# Patient Record
(demographics unavailable — no encounter records)

---

## 2024-11-04 NOTE — REVIEW OF SYSTEMS
[Fever] : no fever [Chills] : no chills [Chest Pain] : no chest pain [Palpitations] : no palpitations [Cough] : no cough [SOB on Exertion] : shortness of breath during exertion [see HPI] : see HPI

## 2024-11-04 NOTE — HISTORY OF PRESENT ILLNESS
[Lung Cancer Screening] : Patient underwent lung cancer screening [1] : 1 [FreeTextEntry1] : Patient presents for annual visit and follow up on [facility]-certified conditions.  [facility]-conditions:   Asthma: He follows with pulm-Dr. Mcgovern, last seen July 2024. His breo was switched to trelegy due to a persistent morning cough and BROOKS. He is doing much better on trelegy. His cough has significantly improved though his BROOKS is still persistent. Going uphill, walking on the beach, going up the stairs triggers his BROOKS. He uses his albuterol once a week depending on what he is doing. He is also taking singulair.   Chronic pharyngitis/nasopharyngitis, chronic rhinitis: Azelastine + Fluticasone was increased to 2 sprays BID. His PND and rhinorrhea has improved considerably with this dose change.   SHARAD: Reports adherence with nocturnal CPAP at 12 cm H2O. Compliance report from August to Nov 2024 shows usage 90/90 days. AHI of 0.7.  PSG done in May 2015 with AHI 61.6. CPAP titration in July recommended CPAP 12 qhs  GERD: Never had endoscopy. Symptoms are stable on Pantoprazole daily, gets reflux if he misses a dose. No dysphagia. Saw GI-Dr. Mckinnon in January.   BCC: Last saw derm Dec 2023. No new lesions.  Hx: Had right axillary lesion biopsied Feb 2015, pathology showed pigmented BCC.    Non-WTC:   He had Left knee surgery for torn meniscus in Oct 2024.   He saw ENT-Dr. Block for hearing loss. He was sent for CT which showed right myringosclerosis, moderate to severe right middle ear opacity that involves the lateral attic meso, retro and hypotympana, moderate to severe right mastoid sinus opacification. He had MT tube placed in right ear. 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 He saw ENT-Dr. Malcolm as his ENT left the practice. He was then referred to neurosurgeon-Dr. Rogers. No indication for surgery at that time as pt had mild b/l high frequency hearing loss only and did not have otorrhea. Pt reports that since early October, he began to have yellowish discharge from the right ear, clogged sensation, and decreased hearing. He is due to see ENT-Dr. Malcolm in December.   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   [TextBox_12] : 03/15 [TextBox_27] : 03/16 [TextBox_42] : 11/22 [TextBox_57] : CT 03/16 with 2 mm RUL nodule; Lung RADS not specified in report

## 2024-11-04 NOTE — HISTORY OF PRESENT ILLNESS
[Lung Cancer Screening] : Patient underwent lung cancer screening [1] : 1 [FreeTextEntry1] : Patient presents for annual visit and follow up on [facility]-certified conditions.  [facility]-conditions:   Asthma: He follows with pulm-Dr. Mcgovern, last seen July 2024. His breo was switched to trelegy due to a persistent morning cough and BROOKS. He is doing much better on trelegy. His cough has significantly improved though his BROOKS is still persistent. Going uphill, walking on the beach, going up the stairs triggers his BROOKS. He uses his albuterol once a week depending on what he is doing. He is also taking singulair.   Chronic pharyngitis/nasopharyngitis, chronic rhinitis: Azelastine + Fluticasone was increased to 2 sprays BID. His PND and rhinorrhea has improved considerably with this dose change.   SHARAD: Reports adherence with nocturnal CPAP at 12 cm H2O. Compliance report from August to Nov 2024 shows usage 90/90 days. AHI of 0.7.  PSG done in May 2015 with AHI 61.6. CPAP titration in July recommended CPAP 12 qhs  GERD: Never had endoscopy. Symptoms are stable on Pantoprazole daily, gets reflux if he misses a dose. No dysphagia. Saw GI-Dr. Mckinnon in January.   BCC: Last saw derm Dec 2023. No new lesions.  Hx: Had right axillary lesion biopsied Feb 2015, pathology showed pigmented BCC.    Non-WTC:   He had Left knee surgery for torn meniscus in Oct 2024.   He saw ENT-Dr. Block for hearing loss. He was sent for CT which showed right myringosclerosis, moderate to severe right middle ear opacity that involves the lateral attic meso, retro and hypotympana, moderate to severe right mastoid sinus opacification. He had MT tube placed in right ear. 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 He saw ENT-Dr. Malcolm as his ENT left the practice. He was then referred to neurosurgeon-Dr. Rogers. No indication for surgery at that time as pt had mild b/l high frequency hearing loss only and did not have otorrhea. Pt reports that since early October, he began to have yellowish discharge from the right ear, clogged sensation, and decreased hearing. He is due to see ENT-Dr. Malcolm in December.   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   [TextBox_12] : 03/15 [TextBox_27] : 03/16 [TextBox_42] : 11/22 [TextBox_57] : CT 03/16 with 2 mm RUL nodule; Lung RADS not specified in report

## 2024-11-04 NOTE — HEALTH RISK ASSESSMENT
[Patient reported colonoscopy was normal] : Patient reported colonoscopy was normal [1] : 1 [ColonoscopyDate] : 08/2024 [ColonoscopyComments] : 1 polyp, return in 5 years  [LowDoseCTScan] : 11/22

## 2024-11-04 NOTE — REASON FOR VISIT
[Follow-Up] : a follow-up visit [FreeTextEntry1] : chronic pharyngitis/nasopharyngitis, chronic rhinitis, GERD, asthma, SHARAD and BCC

## 2024-11-04 NOTE — ASSESSMENT
[FreeTextEntry1] : Asthma:  -continue trelegy and singulair daily  -continue albuterol as needed  -LDCT ordered  -f/u with pulm-Dr. Mcgovern, still with some BROOKS, reports had a cardiac work-up prior to his knee surgery which was reportedly normal  -pt to call if symptoms change/worsen   Chronic pharyngitis/nasopharyngitis and rhinitis: -Continue Azelastine + Fluticasone 2 sprays BID   SHARAD:  -good compliance -continue CPAP at 12cm   GERD:  -symptoms well controlled on daily protonix -continue lifestyle and diet modifications  -pt to call if symptoms change/worsen or needs to see GI   BCC:  -refer to derm for FBSE   Non-WTC conditions:  Pt to f/u with neurosurgeon and ENT for evaluation of tegmen defect

## 2024-11-04 NOTE — PHYSICAL EXAM
[General Appearance - Alert] : alert [General Appearance - In No Acute Distress] : in no acute distress [Sclera] : the sclera and conjunctiva were normal [Extraocular Movements] : extraocular movements were intact [Oropharynx] : the oropharynx was normal [Neck Appearance] : the appearance of the neck was normal [Neck Cervical Mass (___cm)] : no neck mass was observed [Thyroid Diffuse Enlargement] : the thyroid was not enlarged [] : no respiratory distress [Respiration, Rhythm And Depth] : normal respiratory rhythm and effort [Exaggerated Use Of Accessory Muscles For Inspiration] : no accessory muscle use [Auscultation Breath Sounds / Voice Sounds] : lungs were clear to auscultation bilaterally [Apical Impulse] : the apical impulse was normal [Heart Rate And Rhythm] : heart rate was normal and rhythm regular [Heart Sounds] : normal S1 and S2 [Murmurs] : no murmurs [FreeTextEntry1] : 1+ pretibial edema, b/l LE  [Bowel Sounds] : normal bowel sounds [Abdomen Soft] : soft [Abdomen Tenderness] : non-tender [Abdomen Mass (___ Cm)] : no abdominal mass palpated [Oriented To Time, Place, And Person] : oriented to person, place, and time

## 2024-11-04 NOTE — DISCUSSION/SUMMARY
[Patient seen for WTC Monitoring ___] : Patient was seen for WTC monitoring [unfilled] [Please See Note in Chart and Documentation in Trial DB] : Please see note in chart and documentation in Trial DB. [FreeTextEntry3] : ID [id number].  HPI: 63 yo M, certified for chronic pharyngitis/nasopharyngitis, chronic rhinitis, GERD, asthma, SHARAD and BCC, presents for annual visit.   PCP: Dr. Drew Ware, [facility] Pulm: Dr. Mark Mcgovern Cardio: Dr. Darnell Chatterjee GI: Dr. Oneil Weinberg  [facility] Ground [employer] Exposure Hx: On 01, worked 8 hours with [employer] providing [facility] and assisting with evacuation. 01-01, present at [employer] approximately 24 hours sifting through debris and doing search and rescue. 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  PMH/PSH:  - [facility] certified: chronic pharyngitis/nasopharyngitis, chronic rhinitis, GERD, asthma, SHARAD and BCC - Noncertified: HTN, diverticulitis, MI s/pt stents ; s/p L illiac aneurysm endovascular repair (transient ischemic colitis following procedure treated conservatively; subsequent R iliac aneurysm), DM 2016 Family Hx: father had lung cancer Allergies: see above; anaphylaxis to shrimp Meds: trelegy, protonix, valsartan-HCTZ, atorvastatin, plavix, zetia, farxiga, gabapentin, isosorbide mononitrate, metoprolol, metformin, naltrexone, ozempic  Social Hx: two adult children; enjoys camping; originally from  -Smokin pack year history, quit in   Review of Systems: IAMQ reviewed with patient  PE: see follow up note and Trial DB  Results: - LDCT 2023: Emphysema is present. The lungs are otherwise clear - Spirometry: deferred, had it done at pulm office   A/P: - CBC, CMP, lipids and UA ordered - LDCT ordered - Colonoscopy done Aug 2024, 1 polyp, return in 5 years (reviewed on pt's health bryan on phone) - Flu shot declined - Reviewed past labs and imaging

## 2024-11-04 NOTE — DISCUSSION/SUMMARY
[Patient seen for WTC Monitoring ___] : Patient was seen for WTC monitoring [unfilled] [Please See Note in Chart and Documentation in Trial DB] : Please see note in chart and documentation in Trial DB. [FreeTextEntry3] : ID [id number].  HPI: 63 yo M, certified for chronic pharyngitis/nasopharyngitis, chronic rhinitis, GERD, asthma, SHARAD and BCC, presents for annual visit.   PCP: Dr. Drew Ware, [facility] Pulm: Dr. Mark Mcgovern Cardio: Dr. Darnell Chatterjee GI: Dr. Oneil Weinberg  [facility] [employer] Exposure Hx: On 01, worked 8 hours with [employer] providing [facility] and assisting with evacuation. 01-01, present at [facility] approximately 24 hours sifting through debris and doing search and rescue. 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  PMH/PSH:  - [facility] certified: chronic pharyngitis/nasopharyngitis, chronic rhinitis, GERD, asthma, SHARAD and BCC - Noncertified: HTN, diverticulitis, MI s/pt stents ; s/p L illiac aneurysm endovascular repair (transient ischemic colitis following procedure treated conservatively; subsequent R iliac aneurysm), DM 2016 Family Hx: father had lung cancer Allergies: see above; anaphylaxis to shrimp Meds: trelegy, protonix, valsartan-HCTZ, atorvastatin, plavix, zetia, farxiga, gabapentin, isosorbide mononitrate, metoprolol, metformin, naltrexone, ozempic  Social Hx: two adult children; enjoys camping; originally from  -Smokin pack year history, quit in   Review of Systems: IAMQ reviewed with patient  PE: see follow up note and Trial DB  Results: - LDCT 2023: Emphysema is present. The lungs are otherwise clear - Spirometry: deferred, had it done at pulm office   A/P: - CBC, CMP, lipids and UA ordered - LDCT ordered - Colonoscopy done Aug 2024, 1 polyp, return in 5 years (reviewed on pt's health bryan on phone) - Flu shot declined - Reviewed past labs and imaging

## 2024-11-27 NOTE — DATA REVIEWED
Please advise    Last OV: 6/24/24  Next OV: n/a  Last lab: 2/14/24  Alprazolam 0.5MG   Dispensed: 8/15/2023  Sold: 8/15/2023      ALPRAZolam (XANAX) 0.5 MG tablet         Sig: Take 1 tablet by mouth 3 times daily as needed for Anxiety.    Disp: 30 tablet    Refills: 0    Start: 8/1/2024    Class: Eprescribe    Last ordered: 11 months ago (8/15/2023) by Trevin Higuera DO    Controlled Substances Refill Protocol - 12 Month Protocol - ALWAYS forward to ordering provider [id number]/01/2024 09:15 AM   Protocol Details FORWARD TO PROVIDER - Refill requests for these medications must ALWAYS be forwarded to the ordering provider, even if all criteria are met    PDMP has been reviewed in last 24 hours    Urine/serum drug screen on file in the appropriate time frame    Active/up-to-date controlled substances agreement/consent on file    Seen by prescribing provider or same department within the last 6 months or has a future appt in 6 months - IF FAILED PLEASE LOOK AT CHART REVIEW FOR LAST VISIT AND PROCEED ACCORDINGLY            [Lung Cancer Screening] : Patient underwent lung cancer screening

## 2024-11-27 NOTE — HISTORY OF PRESENT ILLNESS
[Former] : Former [TextBox_13] : Patient is scheduled for a annual  LDCT for lung cancer screening.  Chart review performed to confirm eligibility for LDCT.    No documented personal history of lung cancer. No documented s/s of lung cancer. Patient is a former smoker with a 20 pack year hx. [PacksperYear] : 20

## 2024-12-04 NOTE — HISTORY OF PRESENT ILLNESS
[de-identified] : 64 year old male with tegmen defect and cephalocele seen on MRI  Had right foul smelling otorrhea in October- used ear drops which helped. Saw Dr Rogers who sent him for MRI Brain- h

## 2024-12-04 NOTE — PHYSICAL EXAM
[de-identified] : right tube dry & open, left TM normal  [Midline] : trachea located in midline position [Normal] : orientation to person, place, and time: normal

## 2024-12-04 NOTE — HISTORY OF PRESENT ILLNESS
[de-identified] : 64 year old male with tegmen defect and cephalocele seen on MRI  Had right foul smelling otorrhea in October- used ear drops which helped. Saw Dr Rogers who sent him for MRI Brain- h

## 2024-12-04 NOTE — PHYSICAL EXAM
[de-identified] : right tube dry & open, left TM normal  [Midline] : trachea located in midline position [Normal] : orientation to person, place, and time: normal

## 2024-12-19 NOTE — PHYSICAL EXAM
[Midline] : trachea located in midline position [Normal] : orientation to person, place, and time: normal [de-identified] : right tube open, dry, left normal

## 2024-12-19 NOTE — PHYSICAL EXAM
[Midline] : trachea located in midline position [Normal] : orientation to person, place, and time: normal [de-identified] : right tube open, dry, left normal

## 2024-12-19 NOTE — HISTORY OF PRESENT ILLNESS
[de-identified] : 64 year old male with tegmen defect and cephalocele seen on MRI presents for follow-up to discuss surgery. Reports constant right "pulsatile" tinnitus and decreased hearing in the Right. Denies otalgia, otorrhea, recent fevers/ear infections, dizziness, vertigo, headaches related to hearing.  CT IACs No Cont done 12/5/2024 IMPRESSION: Right-sided cephalocele in association with a tegmen dehiscence

## 2024-12-19 NOTE — DATA REVIEWED
[de-identified] : Hearing WNL through 3 KHz, sloping to a mild to moderate SNHL. RE: Mild conductive HL at 250 Hz, rising to hearing WNL through 4 KHz, sloping to a moderate to moderately severe HL. Type B Tymp, RE, and Type A Tymp, LE.

## 2024-12-19 NOTE — DATA REVIEWED
[de-identified] : Hearing WNL through 3 KHz, sloping to a mild to moderate SNHL. RE: Mild conductive HL at 250 Hz, rising to hearing WNL through 4 KHz, sloping to a moderate to moderately severe HL. Type B Tymp, RE, and Type A Tymp, LE.

## 2024-12-19 NOTE — HISTORY OF PRESENT ILLNESS
[de-identified] : 64 year old male with tegmen defect and cephalocele seen on MRI presents for follow-up to discuss surgery. Reports constant right "pulsatile" tinnitus and decreased hearing in the Right. Denies otalgia, otorrhea, recent fevers/ear infections, dizziness, vertigo, headaches related to hearing.  CT IACs No Cont done 12/5/2024 IMPRESSION: Right-sided cephalocele in association with a tegmen dehiscence

## 2025-01-15 NOTE — HISTORY OF PRESENT ILLNESS
[Former] : former [< 20 pack-years] : < 20 pack-years [Never] : never [TextBox_4] : Mr. Felix is a 65 year-old male with a history of [facility] [employer] exposure, mild persistent asthma, upper airway cough syndrome, HTN, HLD, CAD s/p MI s/p stents (2015), SHARAD on CPAP 12 cm H2O qhs, former smoker, PVD s/p bilateral iliac artery endovascular repair, GERD, and h/o diverticulitis who presents for follow-up. He remains adherent with Trelegy Ellipta 200 mcg 1 inhalation daily, rinses after use. He reports significant improvement in dyspnea, cough, and chest tightness with Trelegy. He also takes montelukast 10 mg at bedtime. He is prescribed fluticasone and azelastine nasal sprays for his upper airway cough syndrome. Morning cough is improved. He takes albuterol prn, 2 times per week. Also takes pantoprazole for GERD.Reports adherence with nocturnal CPAP 12 cm H2O. Compliance review reveals adherence on 30/30 days, 10 hours 3 minutes per day, no significant leak, and therapeutic AHI is 1. He changes the water daily. Changes tubing and mask regularly.  PFTs (Jan 2025) with mild restriction, borderline low lung volumes, and normal DLCO. Incomplete bronchodilator response. Note that although FEV1/FVC ratio is above the lower limit of normal, the ratio of 68% is low, suggestive of likely COPD given smoking history.  CT CHEST lung cancer screening (Dec 2024) no consolidation, pleural effusion, lymphadenopathy, pneumothorax, endobronchial disease, or suspicious lung nodule. There is bilateral subsegmental atelectasis. No bronchiectasis or honeycombing. Pending repeat in December 2025.  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 CT chest lung cancer screening in Nov 2023 with clear lungs, Lung RADS 1. Repeat in Nov 2024.  SHARAD: sleep study in May 2015 with AHI 61.6. CPAP titration in July recommended CPAP 12 qhs. He is adherent with nightly therapy. Reports improved daytime somnolence. Does report falling asleep during the day, but does not fall asleep while driving. He does not clean the tubing or mask. He changes the filter water daily  Up to date with COVID-19 vaccinations. [TextBox_11] : 1 [TextBox_13] : 30 [YearQuit] : 2015

## 2025-01-15 NOTE — REVIEW OF SYSTEMS
[Postnasal Drip] : postnasal drip [Cough] : cough [Dyspnea] : dyspnea [Wheezing] : wheezing [Negative] : Endocrine [Chest Tightness] : no chest tightness

## 2025-01-15 NOTE — PHYSICAL EXAM
[No Acute Distress] : no acute distress [Well Nourished] : well nourished [Well Developed] : well developed [Normal Oropharynx] : normal oropharynx [Normal Appearance] : normal appearance [Supple] : supple [No Neck Mass] : no neck mass [No JVD] : no jvd [Normal Rate/Rhythm] : normal rate/rhythm [Normal Pulses] : normal pulses [Normal S1, S2] : normal s1, s2 [No Murmurs] : no murmurs [No Resp Distress] : no resp distress [No Acc Muscle Use] : no acc muscle use [Clear to Auscultation Bilaterally] : clear to auscultation bilaterally [No Abnormalities] : no abnormalities [Benign] : benign [Not Tender] : not tender [Soft] : soft [Normal Gait] : normal gait [No Clubbing] : no clubbing [No Cyanosis] : no cyanosis [No Edema] : no edema [FROM] : FROM [Normal Color/ Pigmentation] : normal color/ pigmentation [No Focal Deficits] : no focal deficits [Cranial Nerves Intact] : cranial nerves intact [No Motor Deficits] : no motor deficits [Oriented x3] : oriented x3 [Normal Affect] : normal affect [TextBox_2] : obese [TextBox_68] : no wheezing or rales [TextBox_89] : obese

## 2025-01-15 NOTE — ASSESSMENT
[FreeTextEntry1] : -  Mr. Felix is a 65 year-old male with a history of [employer] exposure, mild persistent asthma, upper airway cough syndrome, HTN, HLD, CAD s/p MI s/p stents (2015), SHARAD on CPAP 12 cm H2O qhs, former smoker, PVD s/p bilateral iliac artery endovascular repair, GERD, and h/o diverticulitis who presents for follow-up. He remains adherent with Trelegy Ellipta 200 mcg 1 inhalation daily, rinses after use. He reports significant improvement in dyspnea, cough, and chest tightness with Trelegy. He also takes montelukast 10 mg at bedtime. He is prescribed fluticasone and azelastine nasal sprays for his upper airway cough syndrome. Morning cough is improved. He takes albuterol prn, 2 times per week. Also takes pantoprazole for GERD.Reports adherence with nocturnal CPAP 12 cm H2O. Compliance review reveals adherence on 30/30 days, 10 hours 3 minutes per day, no significant leak, and therapeutic AHI is 1. He changes the water daily. Changes tubing and mask regularly.  PFTs (Jan 2025) with mild restriction, borderline low lung volumes, and normal DLCO. Incomplete bronchodilator response. Note that although FEV1/FVC ratio is above the lower limit of normal, the ratio of 68% is low, suggestive of likely COPD given smoking history.  CT CHEST lung cancer screening (Dec 2024) no consolidation, pleural effusion, lymphadenopathy, pneumothorax, endobronchial disease, or suspicious lung nodule. There is bilateral subsegmental atelectasis. No bronchiectasis or honeycombing. Pending repeat in December 2025.  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 - Significant improvement in symptoms with Trelegy - PFTs today reviewed, stable mild restriction + likely obstruction given FEV1/FVC<70%, borderline low lung volumes, and normal DLCO - Continue Trelegy Ellipta inhaler 200 mcg 1 inhalation daily, rinse after use - Albuterol inhaler PRN - Montelukast 10 mg at bedtime - Hold off on CPET at this time given improvement in dyspnea  # Upper airway cough syndrome: - Continue fluticasone nasal spray 2 sprays per nostril 1-2x/day - Continue azelastine nasal spray 2 sprays per nostril 1-2x/day - Hold off on cetirizine for now given resolved cough - Previously declined Fabien med nasal saline irrigation  # GERD: - Continue pantoprazole for GERD - Avoid spicy foods/drinks, citrus foods/drinks, tomato-based foods/sauces, caffeine, chocolate, late evening meals, carbonated beverages, alcohol, and fatty foods  # Former smoker: - Quit smoking in 2015, former 30 pack year smoker - CT chest lung cancer screening in Dec 2024 with no suspicious lung nodules - Next due for CT chest lung cancer screening in Dec 2025 - The patient participated in a shared-decision making session where benefits, limitations, and potential harms were discussed. The patient is eligible based on age, calculation of cigarette-smoking pack years, and years since quitting. The patient was informed of the importance of adherence to annual screening and the impact of comorbidities. The patient is asymptomatic (no signs or symptoms of lung cancer)  # Severe SHARAD: - Sleep study in May 2015 with AHI 61.6 followed by CPAP titration in July 2015 recommended CPAP 12 qhs - Continue CPAP 12 cm H2O every night - Compliance review today reveals usage on 30/30 nights for average 10 hours 3 minutes daily with therapeutic AHI 1/hr and no significant leak  # HCM: - Influenza vaccine given in left arm today - Will give Prevnar 20 next visit in June 2025 - Up to date with COVID-19 vaccinations  - Follow-up in June 2025 or sooner PRN

## 2025-01-15 NOTE — ASSESSMENT
[FreeTextEntry1] : -  Mr. Felix is a 65 year-old male with a history of [employer] exposure, mild persistent asthma, upper airway cough syndrome, HTN, HLD, CAD s/p MI s/p stents (2015), SHARAD on CPAP 12 cm H2O qhs, former smoker, PVD s/p bilateral iliac artery endovascular repair, GERD, and h/o diverticulitis who presents for follow-up. He remains adherent with Trelegy Ellipta 200 mcg 1 inhalation daily, rinses after use. He reports significant improvement in dyspnea, cough, and chest tightness with Trelegy. He also takes montelukast 10 mg at bedtime. He is prescribed fluticasone and azelastine nasal sprays for his upper airway cough syndrome. Morning cough is improved. He takes albuterol prn, 2 times per week. Also takes pantoprazole for GERD.Reports adherence with nocturnal CPAP 12 cm H2O. Compliance review reveals adherence on 30/30 days, 10 hours 3 minutes per day, no significant leak, and therapeutic AHI is 1. He changes the water daily. Changes tubing and mask regularly.  PFTs (Jan 2025) with mild restriction, borderline low lung volumes, and normal DLCO. Incomplete bronchodilator response. Note that although FEV1/FVC ratio is above the lower limit of normal, the ratio of 68% is low, suggestive of likely COPD given smoking history.  CT CHEST lung cancer screening (Dec 2024) no consolidation, pleural effusion, lymphadenopathy, pneumothorax, endobronchial disease, or suspicious lung nodule. There is bilateral subsegmental atelectasis. No bronchiectasis or honeycombing. Pending repeat in December 2025.  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 - Significant improvement in symptoms with Trelegy - PFTs today reviewed, stable mild restriction + likely obstruction given FEV1/FVC<70%, borderline low lung volumes, and normal DLCO - Continue Trelegy Ellipta inhaler 200 mcg 1 inhalation daily, rinse after use - Albuterol inhaler PRN - Montelukast 10 mg at bedtime - Hold off on CPET at this time given improvement in dyspnea  # Upper airway cough syndrome: - Continue fluticasone nasal spray 2 sprays per nostril 1-2x/day - Continue azelastine nasal spray 2 sprays per nostril 1-2x/day - Hold off on cetirizine for now given resolved cough - Previously declined Fabien med nasal saline irrigation  # GERD: - Continue pantoprazole for GERD - Avoid spicy foods/drinks, citrus foods/drinks, tomato-based foods/sauces, caffeine, chocolate, late evening meals, carbonated beverages, alcohol, and fatty foods  # Former smoker: - Quit smoking in 2015, former 30 pack year smoker - CT chest lung cancer screening in Dec 2024 with no suspicious lung nodules - Next due for CT chest lung cancer screening in Dec 2025 - The patient participated in a shared-decision making session where benefits, limitations, and potential harms were discussed. The patient is eligible based on age, calculation of cigarette-smoking pack years, and years since quitting. The patient was informed of the importance of adherence to annual screening and the impact of comorbidities. The patient is asymptomatic (no signs or symptoms of lung cancer)  # Severe SHARAD: - Sleep study in May 2015 with AHI 61.6 followed by CPAP titration in July 2015 recommended CPAP 12 qhs - Continue CPAP 12 cm H2O every night - Compliance review today reveals usage on 30/30 nights for average 10 hours 3 minutes daily with therapeutic AHI 1/hr and no significant leak  # HCM: - Influenza vaccine given in left arm today - Will give Prevnar 20 next visit in June 2025 - Up to date with COVID-19 vaccinations  - Follow-up in June 2025 or sooner PRN

## 2025-01-15 NOTE — HISTORY OF PRESENT ILLNESS
[Former] : former [< 20 pack-years] : < 20 pack-years [Never] : never [TextBox_4] : Mr. Felix is a 65 year-old male with a history of [facility] [employer] exposure, mild persistent asthma, upper airway cough syndrome, HTN, HLD, CAD s/p MI s/p stents (2015), SHARAD on CPAP 12 cm H2O qhs, former smoker, PVD s/p bilateral iliac artery endovascular repair, GERD, and h/o diverticulitis who presents for follow-up. He remains adherent with Trelegy Ellipta 200 mcg 1 inhalation daily, rinses after use. He reports significant improvement in dyspnea, cough, and chest tightness with Trelegy. He also takes montelukast 10 mg at bedtime. He is prescribed fluticasone and azelastine nasal sprays for his upper airway cough syndrome. Morning cough is improved. He takes albuterol prn, 2 times per week. Also takes pantoprazole for GERD.Reports adherence with nocturnal CPAP 12 cm H2O. Compliance review reveals adherence on 30/30 days, 10 hours 3 minutes per day, no significant leak, and therapeutic AHI is 1. He changes the water daily. Changes tubing and mask regularly.  PFTs (Jan 2025) with mild restriction, borderline low lung volumes, and normal DLCO. Incomplete bronchodilator response. Note that although FEV1/FVC ratio is above the lower limit of normal, the ratio of 68% is low, suggestive of likely COPD given smoking history.  CT CHEST lung cancer screening (Dec 2024) no consolidation, pleural effusion, lymphadenopathy, pneumothorax, endobronchial disease, or suspicious lung nodule. There is bilateral subsegmental atelectasis. No bronchiectasis or honeycombing. Pending repeat in December 2025.  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 CT chest lung cancer screening in Nov 2023 with clear lungs, Lung RADS 1. Repeat in Nov 2024.  SHARAD: sleep study in May 2015 with AHI 61.6. CPAP titration in July recommended CPAP 12 qhs. He is adherent with nightly therapy. Reports improved daytime somnolence. Does report falling asleep during the day, but does not fall asleep while driving. He does not clean the tubing or mask. He changes the filter water daily  Up to date with COVID-19 vaccinations. [TextBox_11] : 1 [TextBox_13] : 30 [YearQuit] : 2015

## 2025-01-27 NOTE — HISTORY OF PRESENT ILLNESS
[FreeTextEntry1] : 63 year old male with history significant for Aortoiliac aneurysm s/p Endovascular repair of aortoiliac aneurysm with percutaneous approach In October 2022. Patient reports back pain with standing but not at rest. Denies any abdominal pain.

## 2025-01-27 NOTE — PHYSICAL EXAM
[JVD] : no jugular venous distention  [Normal Breath Sounds] : Normal breath sounds [2+] : left 2+ [Ankle Swelling (On Exam)] : not present [Varicose Veins Of Lower Extremities] : not present [] : not present [No Rash or Lesion] : No rash or lesion [Alert] : alert [de-identified] : non-toxic

## 2025-01-27 NOTE — ASSESSMENT
[FreeTextEntry1] : 63 year old male with history significant for Aortoiliac aneurysm s/p Endovascular repair of aortoiliac aneurysm.  In the office today, patient underwent a duplex which demonstrated no evidence of endoleak and right common iliac sac at 3.1cm and left common iliac sac at 2.6 unchanged from previous study.  Patient continues to have significant buttock claudication which is unchanged.  No rest pain.  Patient currently undergoing workup for neurosurgical procedure.  Will continue close monitoring. Follow-up with repeat duplex in 6 months

## 2025-01-28 NOTE — HISTORY OF PRESENT ILLNESS
[FreeTextEntry1] : moles  [de-identified] : 63 year old male here for skin check. hx of BCC on the R neck ~2014, unsure year   1. Moles throughout body, none changing or symptomatic. Requesting TBSE 2. Intermittently gets irritation/ rash in the groin, improves with desitin

## 2025-01-28 NOTE — ASSESSMENT
[FreeTextEntry1] : #Seborrheic Keratosis  #Lentigines  #Multiple melanocytic nevi, benign  Screening exam for skin cancer - benign findings as above  - TBSE performed today - Advised sun protection.  - Counseled patient to monitor for changes - rtc q1yr for repeat skin exam or sooner if new/concerning lesion   #History of  NMSC  - No evidence of recurrence   Tinea pedis Acute, flaring - Discussed etiology and possibility for recurrent flares  - START Ketoconazole 2% cream to AA BID until resolved   #Irritant derm, groin  - Vaseline/ desitin for maintenance  - TAC 0.1% cream, max 1 week at a time, stop when rash improves

## 2025-01-28 NOTE — PHYSICAL EXAM
[FreeTextEntry3] : Full body skin exam was performed. The patient is well-appearing, in no acute distress, alert and oriented x 3. Mood and affect are normal. A complete cutaneous examination of the scalp, face, neck, chest, abdomen, back, bilateral arms, bilateral legs, buttocks, digits, nails, eyelids, conjunctiva and lips reveals the following significant findings:  - multiple benign nevi and lentigines - stuck on waxy brown papules scattered on body  - scaly on b/l feet and in between toes  - mild erythema in the groin    Genital exam declined

## 2025-02-26 NOTE — HISTORY OF PRESENT ILLNESS
[de-identified] : 65 year old male with tegmen defect and cephalocele seen on MRI presents for post op visit s/p Right tympanomastoidectomy with repair of CSF leak, removal of myringotomy tube, myringoplasty and facial nerve monitoring 2/18/25. Having discomfort to right ear. Noticed clear otorrhea yesterday, continues today. Hearing is muffled on left side. No fevers or bleeding.

## 2025-02-26 NOTE — PHYSICAL EXAM
[de-identified] : wound CDI [de-identified] : pack dry and secure [Normal] : orientation to person, place, and time: normal

## 2025-02-26 NOTE — PHYSICAL EXAM
[de-identified] : wound CDI [de-identified] : pack dry and secure [Normal] : orientation to person, place, and time: normal

## 2025-02-26 NOTE — HISTORY OF PRESENT ILLNESS
[de-identified] : 65 year old male with tegmen defect and cephalocele seen on MRI presents for post op visit s/p Right tympanomastoidectomy with repair of CSF leak, removal of myringotomy tube, myringoplasty and facial nerve monitoring 2/18/25. Having discomfort to right ear. Noticed clear otorrhea yesterday, continues today. Hearing is muffled on left side. No fevers or bleeding.

## 2025-03-26 NOTE — HISTORY OF PRESENT ILLNESS
[de-identified] : 66 yo M with hx of tegmen defect and cephalocele seen on MRI now  s/p Right tympanomastoidectomy with repair of CSF leak, removal of myringotomy tube, myringoplasty and facial nerve monitoring 2/18/25 presents for follow up. Used drops as directed. Hearing AD worse and feels clogged. No otorrhea or otalgia. No vertigo. No incisional drainage.

## 2025-03-26 NOTE — PHYSICAL EXAM
[de-identified] : TM INTACT, NO FLUID, TM SLIGHTLY THICK [Normal] : orientation to person, place, and time: normal

## 2025-03-26 NOTE — DATA REVIEWED
[de-identified] : Left: Hearing WNL sloping to a mild to moderate SNHL . Type A tymp Right: Mild to severe SNHL. Type C tymp

## 2025-04-24 NOTE — REASON FOR VISIT
[Home] : at home, [unfilled] , at the time of the visit. [Medical Office: ([facility])___] : at the medical office located in  [Telehealth (audio & video)] : This visit was provided via telehealth using real-time 2-way audio visual technology. [Verbal consent obtained from patient] : the patient, [unfilled] [Follow-Up] : a follow-up visit

## 2025-04-24 NOTE — ASSESSMENT
[FreeTextEntry1] : Asthma: -continue trelegy and singulair daily -continue albuterol inhaler as needed, add on nebs given reported episodes of SOB -f/u with pulm-Dr. Mcgovern -pt to call if symptoms change/worsen  Chronic pharyngitis/nasopharyngitis and rhinitis: -Continue Azelastine + Fluticasone 2 sprays BID  SHARAD: -good compliance -continue CPAP at 12cm  GERD: -symptoms well controlled on daily protonix -continue lifestyle and diet modifications -pt to call if symptoms change/worsen or needs to see GI

## 2025-04-24 NOTE — HISTORY OF PRESENT ILLNESS
[FreeTextEntry1] : Patient presents for follow up on [facility]-certified conditions.  Asthma: He follows with pulm-Dr. Mcgovern, last seen Jan 2025, being treated as "asthma with COPD." His breo was switched to trelegy due to a persistent morning cough and BROOKS. He is doing much better on trelegy. His cough has significantly improved though his BROOKS is still persistent. Going uphill, walking on the beach, going up the stairs triggers his BROOKS. He uses his albuterol once a week depending on what he is doing. He is also taking singulair. Recent PFT from 1/2025 reviewed.  Chronic pharyngitis/nasopharyngitis, chronic rhinitis: He is doing well on Azelastine + Fluticasone 2 sprays BID. His PND and rhinorrhea has improved considerably with this dose change.  SHARAD: Reports adherence with nocturnal CPAP at 12 cm H2O.  PSG done in May 2015 with AHI 61.6. CPAP titration in July recommended CPAP 12 qhs  GERD: Never had endoscopy. Symptoms are stable on Pantoprazole daily, gets reflux if he misses a dose. No dysphagia. Saw GI-Dr. Mckinnon in January.

## 2025-04-24 NOTE — REVIEW OF SYSTEMS
[see HPI] : see HPI [Shortness Of Breath] : shortness of breath [Heartburn] : heartburn [Negative] : Cardiovascular

## 2025-04-30 NOTE — DISCUSSION/SUMMARY
[FreeTextEntry1] : 65-year-old man right-handed with a chronic history of numbness, pain involving the left anterolateral thigh consistent with meralgia paresthetica.  There is mild weakness of left hip flexors. Will request a MRI of the pelvis with and without contrast specially looking at the lumbosacral plexus on the left. Schedule an EMG nerve conduction of the left lower extremity. Discussed treatments for meralgia paresthetica, may benefit from an attempt to do a left lateral femoral cutaneous nerve block will place referral to pain management although he can also use his own provider. Patient to follow-up after above.

## 2025-04-30 NOTE — REVIEW OF SYSTEMS
[Numbness] : numbness [Tingling] : tingling [Abnormal Sensation] : an abnormal sensation [As Noted in HPI] : as noted in HPI [Limb Pain] : limb pain [Negative] : Heme/Lymph

## 2025-04-30 NOTE — HISTORY OF PRESENT ILLNESS
[FreeTextEntry1] : 65-year-old man right-handed with a history of asthma, obstructive sleep apnea, history of iliac artery aneurysm, status post bilateral stenting, aortic aneurysm status post stenting, here for evaluation of persistent pain, numbness tingling, sensations of the left anterolateral thigh which began after the first intervention for his iliac aneurysm back in 2017, he complains of a painful numbness tingling burning, can occur at rest, worse with prolonged sitting, but not modified by standing, bending, lifting. Occasional back pain, nonradiating.  Denies any weakness of the legs.  He also complains of pain of the left buttock which is very tender to pressure feels like somebody stabbing him in the left lower buttock.  Denies similar sensations on the opposite leg. No change in bowel bladder habits, no incontinence. Is seen several pain management doctors, placed on naltrexone which helps the pain from the level of above 10 though now something manageable. Previous evaluations including CT of the pelvis abdomen, he reports having evaluation by [facility] had an MRI of the lower back but still has some degenerative changes.

## 2025-04-30 NOTE — DATA REVIEWED
[de-identified] :     ACC: [id number]     EXAM:  CT ABDOMEN AND PELVIS IC   ORDERED BY:  CAITY GARCIA  PROCEDURE DATE:  01/08/2024    INTERPRETATION:  CLINICAL INFORMATION: Right hip pain.  COMPARISON: CT abdomen/pelvis angiogram abdominal aorta with runoff dated 8/26/2022  CONTRAST/COMPLICATIONS: IV Contrast: Omnipaque 350  90 cc administered   10 cc discarded Oral Contrast: NONE Complications: None reported at time of study completion  PROCEDURE: CT of the Abdomen and Pelvis was performed. Sagittal and coronal reformats were performed.  FINDINGS: LOWER CHEST: Minimal bibasilar dependent atelectasis. Dense coronary artery atherosclerotic occasions. Calcified secretions of the thoracic aorta. Aortic valve leaflet calcifications. Small pericardial effusion.  LIVER: Few scattered stable liver cysts and hypoattenuating lesions too small otherwise characterize. Steatosis with mild hepatomegaly. BILE DUCTS: Normal caliber. GALLBLADDER: Within normal limits. SPLEEN: Within normal limits. PANCREAS: Within normal limits. ADRENALS: Within normal limits. KIDNEYS/URETERS: Kidneys enhance symmetrically without hydronephrosis. Numerous bilateral renal cysts measuring up to 6.8 cm in the lower pole of the left kidney and subcentimeter low-attenuation lesions that are too small to characterize.  BLADDER: Within normal limits. REPRODUCTIVE ORGANS: Prostate is enlarged.  BOWEL: No bowel obstruction or overt bowel wall thickening. Appendix is normal. Diverticulosis without evidence of diverticulitis. PERITONEUM: No ascites, pneumoperitoneum, or loculated collection. No mesenteric lymphadenopathy. VESSELS: Redemonstrated aortic biiliac stents with evidence of revision, similar to prior and suboptimally evaluated in the absence of an arterial phase. Embolization material in the left iliac region. RETROPERITONEUM/LYMPH NODES: No lymphadenopathy. ABDOMINAL WALL: Tiny fat-containing umbilical hernia. BONES: Degenerative changes. No CT apparent pathology involving the right hip/bony pelvis.  IMPRESSION: No acute findings on CT the abdomen and pelvis explain patient's pain.  Additional findings, as described above.  --- End of Report ---      ADALGISA ZHANG MD; Resident Radiologist This document has been electronically signed. FABIANA TORRES DO; Attending Radiologist This document has been electronically signed. Jan 8 2024 11:58PM

## 2025-04-30 NOTE — PHYSICAL EXAM
[General Appearance - Alert] : alert [General Appearance - In No Acute Distress] : in no acute distress [Oriented To Time, Place, And Person] : oriented to person, place, and time [Impaired Insight] : insight and judgment were intact [Affect] : the affect was normal [Person] : oriented to person [Place] : oriented to place [Time] : oriented to time [Concentration Intact] : normal concentrating ability [Visual Intact] : visual attention was ~T not ~L decreased [Writing A Sentence] : no difficulty writing a sentence [Fluency] : fluency intact [Comprehension] : comprehension intact [Reading] : reading intact [Past History] : adequate knowledge of personal past history [Cranial Nerves Optic (II)] : visual acuity intact bilaterally,  visual fields full to confrontation, pupils equal round and reactive to light [Cranial Nerves Oculomotor (III)] : extraocular motion intact [Cranial Nerves Trigeminal (V)] : facial sensation intact symmetrically [Cranial Nerves Facial (VII)] : face symmetrical [Cranial Nerves Vestibulocochlear (VIII)] : hearing was intact bilaterally [Cranial Nerves Glossopharyngeal (IX)] : tongue and palate midline [Cranial Nerves Accessory (XI - Cranial And Spinal)] : head turning and shoulder shrug symmetric [Cranial Nerves Hypoglossal (XII)] : there was no tongue deviation with protrusion [Motor Strength] : muscle strength was normal in all four extremities [No Muscle Atrophy] : normal bulk in all four extremities [Motor Handedness Right-Handed] : the patient is right hand dominant [Motor Strength Hips Left Weakness] : hip weakness was present [4] : hip flexion 4/5 [5] : hip adduction 5/5 [Proprioception] : proprioception was intact [Pain / Temp Decrease Lateral Upper Thigh - Left Only] : diminished left lateral upper thigh [Abnormal Walk] : normal gait [Balance] : balance was intact [2+] : Ankle jerk right 2+ [1+] : Ankle jerk left 1+ [Full Pulse] : the pedal pulses are present [Edema] : there was no peripheral edema [No Visual Abnormalities] : no visible abnormalities [Normal Lordosis] : normal lordosis [No Scoliosis] : no scoliosis [No Tenderness to Palpation] : no spine tenderness on palpation [No Masses] : no masses [Full ROM] : full ROM [No Pain with ROM] : no pain with motion in any direction [Nail Clubbing] : no clubbing  or cyanosis of the fingernails [Musculoskeletal - Swelling] : no joint swelling seen [Motor Tone] : muscle strength and tone were normal [Paresis Pronator Drift Right-Sided] : no pronator drift on the right [Paresis Pronator Drift Left-Sided] : no pronator drift on the left [Motor Strength Hips Right Weakness] : normal hip strength [Motor Strength Knee Right Weakness] : normal knee strength [Motor Strength Ankle Right Weakness] : normal ankle strength [Motor Strength Toes Right Foot Weakness] : normal toe strength [Motor Strength First Toe Right Weakness] : normal great toe strength [Motor Strength Knee Left Weakness] : normal knee strength [Motor Strength Ankle Left Weakness] : normal ankle strength [Motor Strength Toes Left Foot Weakness] : normal toe strength [Motor Strength First Toe Left Weakness] : normal great toe strength [Pain / Temp Decrease Lateral Upper Thigh - Right Only] : normal right lateral upper thigh [Pain / Temp Decrease Lower Medial Thigh & Knee Right Only] : normal right lower medial thigh and knee [Pain / Temp Decrease Lower Medial Thigh & Knee Left Only] : normal left lower medial thigh and knee [] : normal left dorsum of the foot [Past-pointing] : there was no past-pointing [Tremor] : no tremor present [Dysdiadochokinesia Bilaterally] : not present [Coordination - Dysmetria Impaired Finger-to-Nose Bilateral] : not present [Plantar Reflex Right Only] : normal on the right [Plantar Reflex Left Only] : normal on the left [___] : absent on the right [___] : absent on the left [FreeTextEntry6] : Area of diminished soft touch, in the left anterolateral lateral thigh approximately 6 inches caudal to rostral, and 6 to 4 inches front to back with area of hyperesthesia in the center. [FreeTextEntry1] : Negative straight leg shin test bilaterally.  There is tenderness on palpating the left sciatic notch.

## 2025-05-21 NOTE — HISTORY OF PRESENT ILLNESS
[de-identified] :  64 yo M with hx of tegmen defect and cephalocele seen on MRI. S/p Right tympanomastoidectomy with repair of CSF leak, removal of myringotomy tube, myringoplasty and facial nerve monitoring 2/18/25 presents for follow up. Hearing reported about the same since last seen. Using Flonase 3x daily. Denies otalgia, otorrheam, dizziness/vertigo

## 2025-05-23 NOTE — ASSESSMENT
[FreeTextEntry1] : Patient is a 64 yo M who presents for BPH/LUTS, abnormal MRI/bladder lesion concerning for bladder ca.  D/w pt that for LUTS, could be related to his DM and glucosuria.  D/w pt good glycemic control and consideration of trial of flomax. Will give trial of flomax for symptoms. Counseled on proper use and SE profile. Udip neg, PVR 0cc For abnormal MRI/bladder lesion, will check Ucyto and perform cystoscopy. Pt requests to perform today in office - cysto showed BPH and small ~1cm papillary lesion w calcification. D/w pt that need to perform TURBT to remove/biopsy.  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  D/w pt that would also obtain CT urogram.  Given cysto, no PSA today, will plan for PSA after bladder lesion treated/resolved.  F/u for OR for TURBT  Pt will require longitudinal care for pt's chronic/complex urologic conditions.

## 2025-05-23 NOTE — HISTORY OF PRESENT ILLNESS
[FreeTextEntry1] : Patient is a 64 yo M who presents for f/u of LUTS/increased urinary frequency and abnormal MRI bladder finding.  He is s/p circumcision 3/17/21   5/23/25 Patient has been lost for follow up since 2021.  Has been fine from circumcision standpoint, voiding well.  But has significant urinary frequency/urgency q15min.  Denies hematuria.  Reports mild dysuria. Has L iliac artery aneurysm stent placed recently.  He reports L thigh/buttock pain/paresthesias.  MRI pelvis was then ordered. MRI pelvic 5/21/25 noted tiny focus of enhancement along the left posterior bladder wall concerning for early transitional cell neoplasm. Cystoscopic evaluation recommended. Mild prostate enlargement.  Recent labs 11/2024 - UA neg for nit/LE but >1000 gluc, BMP w 131 BS, Cr 0.98

## 2025-05-23 NOTE — PHYSICAL EXAM
[General Appearance - Well Developed] : well developed [Normal Appearance] : normal appearance [General Appearance - In No Acute Distress] : no acute distress [Edema] : no peripheral edema [] : no respiratory distress [Exaggerated Use Of Accessory Muscles For Inspiration] : no accessory muscle use [Penis Abnormality] : normal circumcised penis [Chaperone Present] : A chaperone was present in the examining room during all aspects of the physical examination [FreeTextEntry2] :  Aubrie Aguirre NP

## 2025-05-30 NOTE — HISTORY OF PRESENT ILLNESS
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  Since that time he has been complaining of left lateral thigh "itchiness" which does not extend beyond the knee.  The pain is so severe that he has to scratch it constantly.  No focal weakness.  He does complain of left buttock pain described as a hot burning knife.  5 years ago he had right iliac aneurysm repair and left iliac artery repair.  The right iliac artery aneurysm did not cause pain.  Patient denies any systemic complaints such as fever or weight loss or weight gain.  The patient had an epidural steroid injection without relief.  Application of Lidoderm patch caused burning sensation.  Low-dose naltrexone has significantly diminished the pain from the 15/10 to a 5/10.  He is not sure of the exact dose.  MRI of the lumbosacral plexus and pelvis was unremarkable.  Social history former smoker.  No alcohol or illicit drug use.  Family history: Noncontributory.  Allergies: None known by patient.

## 2025-05-30 NOTE — ASSESSMENT
[FreeTextEntry1] : This is a case of a 65-year-old gentleman status post ilio iliac artery aneurysm shunt placement followed by repair who complains of left lateral leg dysesthesias which appear to be consistent with lateral femoral cutaneous neuralgia.  There is abductor weakness on the left lower extremity.  However, the MRI of the lumbosacral spine was not helpful.  He is already planning a EMG study which I fully agree with.  I also advised him to let me know the exact dose of the low-dose naltrexone because we could potentially increase the dose if necessary.   I spent 45 minute reviewing history, medical records, clinical evaluation, management, discussion of plan.s

## 2025-05-30 NOTE — PHYSICAL EXAM
[FreeTextEntry1] : Constitutional: No signs of distress or signs of toxicity. Mental Status: Alert and well oriented. Speech fluent. No aphasia. Fund of knowledge intact. Psychiatric: Mood stable Cranial Nerve: PERRLA: No papilledema; No VFC; EOM full. no nystagmus. No Era. V1-3 intact. No facial asymmetry, hearing grossly intact; palate elevates symmetrically, tongue midline Motor: No involuntary movements noted.  Adequate bulk, tone throughout, 5/5 strength of all muscle groups except for the left lower extremity abductors 4+/5. DTR: present and symmetrical; no clonus, plantars  downgoing Sensory: intact to primary and secondary modalities including the left lateral femoral cutaneous nerve distribution. Cerebellar: adequate finger to nose and heel to shin bilaterally. Gait: non antalgic or ataxic. Eyes: no redness or swelling HEENT: intact; no signs of trauma. Neck: No masses noted Pulmonary: no respiratory distress Vascular: no temperature, color change or sudomotor changes.; no edema Musculoskeletal: examination of the cervical spine reveals no midline tenderness, range of motion full upon flexion, extension and lateral rotation. Negative facet tenderness, Negative Spurlings bilaterally. examination of the lumbar spine reveals no midline or paraspinal tenderness; Range of motion full upon flexion, extension and lateral rotation; negative facet loading, No tenderness of sciatic notch, No tenderness of bilateral greater trochanters, Negative UCHE, negative SLRT bilaterally, Skin: No rash.

## 2025-06-27 NOTE — ASSESSMENT
[FreeTextEntry1] : Patient is a 64 yo M who presents for BPH/LUTS, abnormal MRI/bladder lesion concerning for bladder ca. LUTS could be related to his DM and glucosuria.  He is improved with flomax S/p TURBT Path is LG Ta bladder ca. D/w pt good prognosis, but still risk of recurrence Renew flomax F/u 3 mos for cysto surveillance   Pt will require longitudinal care for pt's chronic/complex urologic conditions.

## 2025-06-27 NOTE — HISTORY OF PRESENT ILLNESS
[FreeTextEntry1] : Patient is a 66 yo M who presents for f/u of LUTS/increased urinary frequency and abnormal MRI bladder finding.  He is s/p circumcision 3/17/21   5/23/25 Patient has been lost for follow up since 2021.  Has been fine from circumcision standpoint, voiding well.  But has significant urinary frequency/urgency q15min.  Denies hematuria.  Reports mild dysuria. Has L iliac artery aneurysm stent placed recently.  He reports L thigh/buttock pain/paresthesias.  MRI pelvis was then ordered. MRI pelvic 5/21/25 noted tiny focus of enhancement along the left posterior bladder wall concerning for early transitional cell neoplasm. Cystoscopic evaluation recommended. Mild prostate enlargement.  Recent labs 11/2024 - UA neg for nit/LE but >1000 gluc, BMP w 131 BS, Cr 0.98  6/27/25 S/p TURBT 6/18/25 Path is Ta LG bladder cancer. Here for f/u. Was also given flomax previously for LUTS. reports LUTS much improved, better FOS.  Less frequency/nocturia.  Nocturia still 4-5.

## 2025-06-27 NOTE — HISTORY OF PRESENT ILLNESS
[FreeTextEntry1] : Patient is a 64 yo M who presents for f/u of LUTS/increased urinary frequency and abnormal MRI bladder finding.  He is s/p circumcision 3/17/21   5/23/25 Patient has been lost for follow up since 2021.  Has been fine from circumcision standpoint, voiding well.  But has significant urinary frequency/urgency q15min.  Denies hematuria.  Reports mild dysuria. Has L iliac artery aneurysm stent placed recently.  He reports L thigh/buttock pain/paresthesias.  MRI pelvis was then ordered. MRI pelvic 5/21/25 noted tiny focus of enhancement along the left posterior bladder wall concerning for early transitional cell neoplasm. Cystoscopic evaluation recommended. Mild prostate enlargement.  Recent labs 11/2024 - UA neg for nit/LE but >1000 gluc, BMP w 131 BS, Cr 0.98  6/27/25 S/p TURBT 6/18/25 Path is Ta LG bladder cancer. Here for f/u. Was also given flomax previously for LUTS. reports LUTS much improved, better FOS.  Less frequency/nocturia.  Nocturia still 4-5.

## 2025-07-28 NOTE — PHYSICAL EXAM
[JVD] : no jugular venous distention  [Normal Breath Sounds] : Normal breath sounds [2+] : left 2+ [Ankle Swelling (On Exam)] : not present [Varicose Veins Of Lower Extremities] : not present [] : not present [No Rash or Lesion] : No rash or lesion [Alert] : alert [de-identified] : non-toxic

## 2025-07-28 NOTE — ASSESSMENT
[FreeTextEntry1] : 63 year old male with history significant for Aortoiliac aneurysm s/p Endovascular repair of aortoiliac aneurysm.  In the office today, patient underwent a duplex which demonstrated no evidence of endoleak and right common iliac sac at 3.1cm and left common iliac sac at 2.6 unchanged from previous study.  Patient continues to have significant buttock claudication which is unchanged.  The symptoms are very significant and severely diminished his ability to walk.  Considering the symptoms have been present and have not improved at all since the endovascular repair of the aortoiliac aneurysms, at this point the symptoms are permanent leading to permanent disability of the patient as far as walking goes.  Recommend continuation of physical therapy.  Follow-up with repeat duplex in 6 months

## 2025-07-30 NOTE — REVIEW OF SYSTEMS
[As Noted in HPI] : as noted in HPI [Leg Weakness] : no leg weakness [Numbness] : numbness [Tingling] : tingling [Difficulty Walking] : no difficulty walking [Frequent Falls] : not falling [Negative] : Heme/Lymph

## 2025-07-30 NOTE — PHYSICAL EXAM
[General Appearance - Alert] : alert [General Appearance - In No Acute Distress] : in no acute distress [Person] : oriented to person [Place] : oriented to place [Time] : oriented to time [Cranial Nerves Optic (II)] : visual acuity intact bilaterally,  visual fields full to confrontation, pupils equal round and reactive to light [Cranial Nerves Oculomotor (III)] : extraocular motion intact [Cranial Nerves Trigeminal (V)] : facial sensation intact symmetrically [Cranial Nerves Facial (VII)] : face symmetrical [Cranial Nerves Vestibulocochlear (VIII)] : hearing was intact bilaterally [Cranial Nerves Glossopharyngeal (IX)] : tongue and palate midline [Cranial Nerves Accessory (XI - Cranial And Spinal)] : head turning and shoulder shrug symmetric [Cranial Nerves Hypoglossal (XII)] : there was no tongue deviation with protrusion [Motor Tone] : muscle tone was normal in all four extremities [Motor Strength] : muscle strength was normal in all four extremities [No Muscle Atrophy] : normal bulk in all four extremities [Motor Handedness Right-Handed] : the patient is right hand dominant [Motor Strength Hips Left Weakness] : hip weakness was present [4] : hip flexion 4/5 [5] : hip adduction 5/5 [Proprioception] : proprioception was intact [Pain / Temp Decrease Lateral Upper Thigh - Left Only] : diminished left lateral upper thigh [Abnormal Walk] : normal gait [Balance] : balance was intact [2+] : Ankle jerk right 2+ [1+] : Ankle jerk left 1+ [FreeTextEntry1] : Overweight [Motor Strength Hips Right Weakness] : normal hip strength [Motor Strength Knee Right Weakness] : normal knee strength [Motor Strength Ankle Right Weakness] : normal ankle strength [Motor Strength Toes Right Foot Weakness] : normal toe strength [Motor Strength First Toe Right Weakness] : normal great toe strength [Motor Strength Knee Left Weakness] : normal knee strength [Motor Strength Ankle Left Weakness] : normal ankle strength [Motor Strength Toes Left Foot Weakness] : normal toe strength [Motor Strength First Toe Left Weakness] : normal great toe strength [Pain / Temp Decrease Lateral Upper Thigh - Right Only] : normal right lateral upper thigh [Pain / Temp Decrease Lower Medial Thigh & Knee Right Only] : normal right lower medial thigh and knee [Pain / Temp Decrease Lower Medial Thigh & Knee Left Only] : normal left lower medial thigh and knee [] : normal left dorsum of the foot [Past-pointing] : there was no past-pointing [Tremor] : no tremor present [Dysdiadochokinesia Bilaterally] : not present [Coordination - Dysmetria Impaired Finger-to-Nose Bilateral] : not present [Plantar Reflex Right Only] : normal on the right [Plantar Reflex Left Only] : normal on the left [___] : absent on the right [___] : absent on the left [FreeTextEntry6] : Area of diminished soft touch, in the left anterolateral lateral thigh approximately 6 inches caudal to rostral, and 6 to 4 inches front to back with area of hyperesthesia in the center.

## 2025-07-30 NOTE — HISTORY OF PRESENT ILLNESS
[FreeTextEntry1] : 65-year-old man right-handed with a history of diabetes, here for follow-up visit, scheduled EMG nerve conduction of the lower extremities last seen in April of this year.  Patient with a history of a left iliac artery repair back in 2013, since then complaining of uncomfortable numbness in the left lateral calf.  No associated weakness, change in balance.  No numbness below the knees.  Recent MRI of the pelvis looking at the plexus showed no abnormality except for possible bladder cancer, followed by urology.

## 2025-07-30 NOTE — DATA REVIEWED
[de-identified] :   MR Pelvis w/wo IV Cont             Final  No Documents Attached    	 EXAM: [id number] - MR PELVIS WAW IC  - ORDERED BY: SHAINA SCHUSTER   PROCEDURE DATE:  05/21/2025    INTERPRETATION:  CLINICAL INFORMATION: r/o left lumbar plexopathy;  COMPARISON: Comparison made with previous examination(s) dated (CT) 05-Dec-2024,(DX) 08-Jan-2024,(CT) 08-Jan-2024,(CT) 27-Nov-2023,(CT) 28-Nov-2022,(CT) 26-Aug-2022,(CT) 21-Jan-2020,(CT) 29-Aug-2019,(CT) 03-Aug-2017,(CT) 22-Dec-2016,(CT) 15-Sep-2016,(CR) 04-Dec-2011,(CT) 04-Dec-2011.  CONTRAST/COMPLICATIONS: IV Contrast: Gadavist    10 cc administered     0 cc discarded Oral Contrast: NONE .  PROCEDURE: MRI of the pelvis was performed.  FINDINGS:  PROSTATE: The prostate is mildly enlarged. Areas of curvilinear and triangular low signal intensity in the peripheral zone are noted without discrete mass. BLADDER: There is a tiny focus of nodular enhancement along the left posterior bladder wall measuring 4 mm, concerning for possible early transitional cell neoplasm. Cystoscopic evaluation recommended. BOWEL: Sigmoid diverticulosis. SOFT TISSUES: No pelvic lymphadenopathy. Unremarkable sciatic notch. BONES: Normal bone marrow signal.  IMPRESSION: Tiny focus of enhancement along the left posterior bladder wall concerning for early transitional cell neoplasm. Cystoscopic evaluation recommended. Mild prostate enlargement. Sigmoid diverticulosis.